# Patient Record
Sex: MALE | Race: WHITE | Employment: UNEMPLOYED | URBAN - METROPOLITAN AREA
[De-identification: names, ages, dates, MRNs, and addresses within clinical notes are randomized per-mention and may not be internally consistent; named-entity substitution may affect disease eponyms.]

---

## 2017-02-03 RX ORDER — GABAPENTIN 300 MG/1
CAPSULE ORAL
Qty: 360 CAPSULE | Refills: 0 | Status: SHIPPED | OUTPATIENT
Start: 2017-02-03 | End: 2017-04-27

## 2017-02-03 RX ORDER — LISINOPRIL 10 MG/1
TABLET ORAL
Qty: 90 TABLET | Refills: 0 | Status: SHIPPED | OUTPATIENT
Start: 2017-02-03 | End: 2017-05-16

## 2017-02-03 NOTE — TELEPHONE ENCOUNTER
Last OV 9/2/16 b/p=116/70  Last ordered gabapentin 11/1/16 #360                      Lisinopril 8/10/16 #90

## 2017-04-27 RX ORDER — GABAPENTIN 300 MG/1
CAPSULE ORAL
Qty: 360 CAPSULE | Refills: 0 | Status: SHIPPED | OUTPATIENT
Start: 2017-04-27 | End: 2017-07-12

## 2017-05-16 RX ORDER — LISINOPRIL 10 MG/1
TABLET ORAL
Qty: 90 TABLET | Refills: 0 | Status: SHIPPED | OUTPATIENT
Start: 2017-05-16 | End: 2017-09-06

## 2017-07-13 RX ORDER — GABAPENTIN 300 MG/1
CAPSULE ORAL
Qty: 360 CAPSULE | Refills: 0 | Status: SHIPPED | OUTPATIENT
Start: 2017-07-13 | End: 2017-10-03

## 2017-08-23 ENCOUNTER — HOSPITAL ENCOUNTER (OUTPATIENT)
Dept: CT IMAGING | Age: 62
Discharge: HOME | End: 2017-08-23
Attending: FAMILY MEDICINE
Payer: MEDICAID

## 2017-08-23 ENCOUNTER — OFFICE VISIT (OUTPATIENT)
Dept: FAMILY MEDICINE CLINIC | Facility: CLINIC | Age: 62
End: 2017-08-23

## 2017-08-23 ENCOUNTER — TELEPHONE (OUTPATIENT)
Dept: FAMILY MEDICINE CLINIC | Facility: CLINIC | Age: 62
End: 2017-08-23

## 2017-08-23 VITALS
SYSTOLIC BLOOD PRESSURE: 130 MMHG | BODY MASS INDEX: 27.88 KG/M2 | DIASTOLIC BLOOD PRESSURE: 78 MMHG | TEMPERATURE: 98 F | HEIGHT: 73 IN | WEIGHT: 210.38 LBS | HEART RATE: 53 BPM | OXYGEN SATURATION: 96 %

## 2017-08-23 DIAGNOSIS — K57.32 DIVERTICULITIS OF LARGE INTESTINE WITHOUT PERFORATION OR ABSCESS WITHOUT BLEEDING: Primary | ICD-10-CM

## 2017-08-23 DIAGNOSIS — G57.93 NEUROPATHY OF BOTH FEET: ICD-10-CM

## 2017-08-23 DIAGNOSIS — Z72.0 TOBACCO ABUSE: ICD-10-CM

## 2017-08-23 DIAGNOSIS — K57.32 DIVERTICULITIS OF LARGE INTESTINE WITHOUT PERFORATION OR ABSCESS WITHOUT BLEEDING: ICD-10-CM

## 2017-08-23 PROCEDURE — 99214 OFFICE O/P EST MOD 30 MIN: CPT | Performed by: FAMILY MEDICINE

## 2017-08-23 RX ORDER — AMOXICILLIN AND CLAVULANATE POTASSIUM 875; 125 MG/1; MG/1
1 TABLET, FILM COATED ORAL 2 TIMES DAILY
Qty: 20 TABLET | Refills: 0 | Status: SHIPPED | OUTPATIENT
Start: 2017-08-23 | End: 2017-09-02

## 2017-08-23 NOTE — TELEPHONE ENCOUNTER
I called [facility] as I noticed that the STAT CT for this afternoon had been rescheduled for tomorrow evening at 8pm    Future Appointments  Date Time Provider Eric Enriquez   8/24/2017 8:00 PM OS CT RM 1 [facility]     Per radiology the jo
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
## 2017-08-23 NOTE — TELEPHONE ENCOUNTER
Patient needs STAT CT abd/pelvis of abdomen for Diverticulitis   In [facility] today    Calling [employer]- spoke with Conchetta [facility]-    He can go now -

## 2017-08-24 ENCOUNTER — HOSPITAL ENCOUNTER (OUTPATIENT)
Dept: CT IMAGING | Age: 62
Discharge: HOME | End: 2017-08-24
Attending: FAMILY MEDICINE
Payer: MEDICAID

## 2017-08-24 PROCEDURE — 74176 CT ABD & PELVIS W/O CONTRAST: CPT | Performed by: FAMILY MEDICINE

## 2017-09-06 RX ORDER — LISINOPRIL 10 MG/1
TABLET ORAL
Qty: 90 TABLET | Refills: 0 | Status: SHIPPED | OUTPATIENT
Start: 2017-09-06 | End: 2017-12-11

## 2017-10-03 RX ORDER — GABAPENTIN 300 MG/1
CAPSULE ORAL
Qty: 360 CAPSULE | Refills: 0 | Status: SHIPPED | OUTPATIENT
Start: 2017-10-03 | End: 2017-12-11

## 2017-12-11 ENCOUNTER — OFFICE VISIT (OUTPATIENT)
Dept: FAMILY MEDICINE CLINIC | Facility: CLINIC | Age: 62
End: 2017-12-11

## 2017-12-11 VITALS
TEMPERATURE: 98 F | WEIGHT: 212.13 LBS | OXYGEN SATURATION: 92 % | DIASTOLIC BLOOD PRESSURE: 86 MMHG | HEART RATE: 55 BPM | BODY MASS INDEX: 28 KG/M2 | SYSTOLIC BLOOD PRESSURE: 130 MMHG

## 2017-12-11 DIAGNOSIS — I10 ESSENTIAL HYPERTENSION: Primary | ICD-10-CM

## 2017-12-11 DIAGNOSIS — Z12.5 PROSTATE CANCER SCREENING: ICD-10-CM

## 2017-12-11 PROCEDURE — 80061 LIPID PANEL: CPT | Performed by: FAMILY MEDICINE

## 2017-12-11 PROCEDURE — 80053 COMPREHEN METABOLIC PANEL: CPT | Performed by: FAMILY MEDICINE

## 2017-12-11 PROCEDURE — 99214 OFFICE O/P EST MOD 30 MIN: CPT | Performed by: FAMILY MEDICINE

## 2017-12-11 PROCEDURE — 36415 COLL VENOUS BLD VENIPUNCTURE: CPT | Performed by: FAMILY MEDICINE

## 2017-12-11 RX ORDER — GABAPENTIN 300 MG/1
CAPSULE ORAL
Qty: 360 CAPSULE | Refills: 0 | Status: SHIPPED | OUTPATIENT
Start: 2017-12-11 | End: 2018-01-22

## 2017-12-11 RX ORDER — LISINOPRIL 10 MG/1
TABLET ORAL
Qty: 90 TABLET | Refills: 0 | Status: SHIPPED | OUTPATIENT
Start: 2017-12-11 | End: 2018-04-03

## 2017-12-11 NOTE — PROGRESS NOTES
Jillian Vee is a 63year old male. Patient presents with: Other: med check. ..room       HPI:   Patient presents for recheck of his blood pressure. He is due for lab work. No complaints of chest pain.   He does get short of breath with exertion but bl (Tympanic)   Wt 212 lb 2 oz   SpO2 92%   BMI 27.99 kg/m²   GENERAL: well developed, well nourished,in no apparent distress  SKIN: no rashes,no suspicious lesions  HEENT: atraumatic, normocephalic, R TM normal, L TM normal, Pharynx normal  NECK: supple, no

## 2017-12-12 NOTE — PROGRESS NOTES
Notify lipids normal. Recheck in 12  months. Notify chem normal including kidney function, liver function, electrolytes and nutritional markers. Recheck comprehensive panel in  12   months. Notify PSA normal.  Repeat in 1 year.

## 2018-01-22 ENCOUNTER — TELEPHONE (OUTPATIENT)
Dept: FAMILY MEDICINE CLINIC | Facility: CLINIC | Age: 63
End: 2018-01-22

## 2018-01-22 RX ORDER — GABAPENTIN 300 MG/1
CAPSULE ORAL
Qty: 360 CAPSULE | Refills: 0 | Status: SHIPPED | OUTPATIENT
Start: 2018-01-22 | End: 2018-04-03

## 2018-04-03 ENCOUNTER — TELEPHONE (OUTPATIENT)
Dept: FAMILY MEDICINE CLINIC | Facility: CLINIC | Age: 63
End: 2018-04-03

## 2018-04-03 RX ORDER — LISINOPRIL 10 MG/1
TABLET ORAL
Qty: 90 TABLET | Refills: 0 | Status: SHIPPED | OUTPATIENT
Start: 2018-04-03 | End: 2018-08-23

## 2018-04-03 RX ORDER — GABAPENTIN 300 MG/1
CAPSULE ORAL
Qty: 180 CAPSULE | Refills: 0 | Status: SHIPPED | OUTPATIENT
Start: 2018-04-03 | End: 2018-08-23

## 2018-06-26 RX ORDER — GABAPENTIN 300 MG/1
CAPSULE ORAL
Qty: 360 CAPSULE | Refills: 0 | Status: SHIPPED | OUTPATIENT
Start: 2018-06-26 | End: 2018-08-23

## 2018-08-16 ENCOUNTER — TELEPHONE (OUTPATIENT)
Dept: FAMILY MEDICINE CLINIC | Facility: CLINIC | Age: 63
End: 2018-08-16

## 2018-08-16 NOTE — TELEPHONE ENCOUNTER
Contacted patient, spoke with wife. Wife states due to insurance changes they have to change PCP and is requesting recent information to be sent. Contacted Dr. Landy Mcbride office at [phone] to clarify what records they would like sent.  Shayla

## 2018-08-23 RX ORDER — GABAPENTIN 300 MG/1
CAPSULE ORAL
Qty: 360 CAPSULE | Refills: 0 | Status: SHIPPED | OUTPATIENT
Start: 2018-08-23

## 2018-08-23 RX ORDER — LISINOPRIL 10 MG/1
TABLET ORAL
Qty: 90 TABLET | Refills: 0 | Status: SHIPPED | OUTPATIENT
Start: 2018-08-23

## 2018-08-23 NOTE — TELEPHONE ENCOUNTER
Last office visit:  12/11/17  Last refill:  04/03/18   #90 with no refills  Last cmp:  12/11/17  Last bp:  12/11/17   130/86    GABAPENTIN:  Last refill:  06/26/18   #360 with no refills    No future appointments.     Calling pt to schedule office visit and

## 2019-02-28 RX ORDER — GABAPENTIN 300 MG/1
CAPSULE ORAL
Qty: 360 CAPSULE | Refills: 0 | OUTPATIENT
Start: 2019-02-28

## 2019-02-28 NOTE — TELEPHONE ENCOUNTER
Last office visit 12-11-17  Last refill 8-23-18 #360  No future appointments. Has Bhargav. Per telephone encounter 8-16-18, sees Dr. Miles Gonzalez.

## 2019-07-05 ENCOUNTER — APPOINTMENT (OUTPATIENT)
Dept: GENERAL RADIOLOGY | Age: 64
End: 2019-07-05
Attending: FAMILY MEDICINE
Payer: MEDICAID

## 2019-07-05 ENCOUNTER — HOSPITAL ENCOUNTER (OUTPATIENT)
Age: 64
Discharge: HOME | End: 2019-07-05
Attending: FAMILY MEDICINE
Payer: MEDICAID

## 2019-07-05 VITALS
HEART RATE: 65 BPM | DIASTOLIC BLOOD PRESSURE: 91 MMHG | SYSTOLIC BLOOD PRESSURE: 154 MMHG | BODY MASS INDEX: 28 KG/M2 | OXYGEN SATURATION: 95 % | WEIGHT: 210 LBS | TEMPERATURE: 98 F | RESPIRATION RATE: 16 BRPM
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
## 2019-07-05 DIAGNOSIS — S80.811A ABRASION OF LEG, RIGHT, INITIAL ENCOUNTER: ICD-10-CM

## 2019-07-05 DIAGNOSIS — S10.91XA: ICD-10-CM

## 2019-07-05 DIAGNOSIS — M79.672 LEFT FOOT PAIN: ICD-10-CM

## 2019-07-05 DIAGNOSIS — M25.561 ACUTE PAIN OF RIGHT KNEE: Primary | ICD-10-CM

## 2019-07-05 PROCEDURE — 73630 X-RAY EXAM OF FOOT: CPT | Performed by: FAMILY MEDICINE

## 2019-07-05 PROCEDURE — 99214 OFFICE O/P EST MOD 30 MIN: CPT

## 2019-07-05 PROCEDURE — 73562 X-RAY EXAM OF KNEE 3: CPT | Performed by: FAMILY MEDICINE
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
## 2020-06-22 ENCOUNTER — HOSPITAL ENCOUNTER (OUTPATIENT)
Age: 65
Discharge: HOME | End: 2020-06-22
Attending: FAMILY MEDICINE
Payer: MEDICAID

## 2020-06-22 ENCOUNTER — APPOINTMENT (OUTPATIENT)
Dept: GENERAL RADIOLOGY | Age: 65
End: 2020-06-22
Attending: FAMILY MEDICINE
Payer: MEDICAID

## 2020-06-22 VITALS
RESPIRATION RATE: 18 BRPM | TEMPERATURE: 99 F | DIASTOLIC BLOOD PRESSURE: 83 MMHG | HEART RATE: 58 BPM | WEIGHT: 200 LBS | BODY MASS INDEX: 26.51 KG/M2 | HEIGHT: 73 IN | OXYGEN SATURATION: 95 % | SYSTOLIC BLOOD PRESSURE: 123 MMHG

## 2020-06-22 DIAGNOSIS — Z20.822 PERSON UNDER INVESTIGATION FOR COVID-19: ICD-10-CM

## 2020-06-22 DIAGNOSIS — J44.1 COPD EXACERBATION (HCC): Primary | ICD-10-CM

## 2020-06-22 PROCEDURE — 71046 X-RAY EXAM CHEST 2 VIEWS: CPT | Performed by: FAMILY MEDICINE

## 2020-06-22 PROCEDURE — 99213 OFFICE O/P EST LOW 20 MIN: CPT

## 2020-06-22 PROCEDURE — 99214 OFFICE O/P EST MOD 30 MIN: CPT

## 2020-06-22 RX ORDER — AZITHROMYCIN 250 MG/1
TABLET, FILM COATED ORAL
Qty: 1 PACKAGE | Refills: 0 | Status: SHIPPED | OUTPATIENT
Start: 2020-06-22 | End: 2020-06-27

## 2020-06-22 RX ORDER — ALBUTEROL SULFATE 90 UG/1
2 AEROSOL, METERED RESPIRATORY (INHALATION) EVERY 4 HOURS PRN
Qty: 1 INHALER | Refills: 0 | Status: SHIPPED | OUTPATIENT
Start: 2020-06-22 | End: 2020-07-22

## 2020-06-22 NOTE — ED PROVIDER NOTES
Patient Seen in: [id number] [facility]      History   Patient presents with:  Dyspnea ESTUARDO SOB    Stated Complaint:     HPI  This is a 73-year-old male coming in with some congestion he has had a cough as well for the last 3 to 4 days.   Has some conversation, smiling and conversing well answering appropriately   SKIN: No pallor, no erythema, no cyanosis, warm and dry  Eyes: wnl, normal conjunctiva   HEAD: Normocephalic, atraumatic  EENT: OP - wnl, moist, Nares normal  NECK: FROM, supple  LUNGS: CT pulmonary opacity. No pleural effusion. Mild degenerative spurring of thoracic spine. CONCLUSION:  Large lung volumes consistent with COPD. No acute cardiopulmonary pathology.     Dictated by: Kyle Quintanilla MD on 6/22/2020 at 6:14 PM     Finalized by: ALEXA

## 2020-06-22 NOTE — ED INITIAL ASSESSMENT (HPI)
Pt co congested cough and ESTUARDO with exacerbation over the past couple of days.   Co sinus congestion

## 2023-09-04 ENCOUNTER — HOSPITAL ENCOUNTER (OUTPATIENT)
Age: 68
Discharge: HOME | End: 2023-09-04
Payer: MEDICARE

## 2023-09-04 VITALS
DIASTOLIC BLOOD PRESSURE: 65 MMHG | SYSTOLIC BLOOD PRESSURE: 140 MMHG | HEART RATE: 60 BPM | TEMPERATURE: 99 F | RESPIRATION RATE: 16 BRPM | OXYGEN SATURATION: 95 %

## 2023-09-04 DIAGNOSIS — S51.851A DOG BITE OF RIGHT FOREARM, INITIAL ENCOUNTER: Primary | ICD-10-CM
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
## 2023-09-04 PROCEDURE — 99203 OFFICE O/P NEW LOW 30 MIN: CPT | Performed by: NURSE PRACTITIONER

## 2023-09-04 PROCEDURE — 90471 IMMUNIZATION ADMIN: CPT | Performed by: NURSE PRACTITIONER

## 2023-09-04 PROCEDURE — 90715 TDAP VACCINE 7 YRS/> IM: CPT | Performed by: NURSE PRACTITIONER

## 2023-09-04 RX ORDER — AMOXICILLIN AND CLAVULANATE POTASSIUM 875; 125 MG/1; MG/1
1 TABLET, FILM COATED ORAL 2 TIMES DAILY
Qty: 14 TABLET | Refills: 0 | Status: SHIPPED | OUTPATIENT
Start: 2023-09-04 | End: 2023-09-11

## 2023-09-04 NOTE — ED INITIAL ASSESSMENT (HPI)
Pt states was in the [facility] and a dog come up to him, he reached to check his collar for the owner and it bit down on his right forearm. Puncture wounds to either side, bleeding controlled. Swelling and pain.

## 2023-09-04 NOTE — DISCHARGE INSTRUCTIONS
Keep the dressing clean, dry, intact for 24 hours. There after may remove the dressing. May clean with mild soap and water, gently pat dry. Use an over-the-counter topical antibiotic ointment such as Neosporin or bacitracin to prevent infection. Return for new or worsening symptoms, especially signs of infection such as fevers, redness, drainage, increasing pain.

## 2024-04-11 ENCOUNTER — APPOINTMENT (OUTPATIENT)
Dept: GENERAL RADIOLOGY | Age: 69
End: 2024-04-11
Attending: NURSE PRACTITIONER
Payer: MEDICARE

## 2024-04-11 ENCOUNTER — HOSPITAL ENCOUNTER (OUTPATIENT)
Age: 69
Discharge: HOME | End: 2024-04-11
Payer: MEDICARE

## 2024-04-11 VITALS
WEIGHT: 199.94 LBS | DIASTOLIC BLOOD PRESSURE: 98 MMHG | RESPIRATION RATE: 18 BRPM | TEMPERATURE: 98 F | OXYGEN SATURATION: 94 % | HEART RATE: 62 BPM | SYSTOLIC BLOOD PRESSURE: 183 MMHG | BODY MASS INDEX: 26 KG/M2

## 2024-04-11 DIAGNOSIS — R05.1 ACUTE COUGH: Primary | ICD-10-CM

## 2024-04-11 DIAGNOSIS — J98.8 VIRAL RESPIRATORY ILLNESS: ICD-10-CM

## 2024-04-11 DIAGNOSIS — B97.89 VIRAL RESPIRATORY ILLNESS: ICD-10-CM

## 2024-04-11 PROCEDURE — 99214 OFFICE O/P EST MOD 30 MIN: CPT | Performed by: NURSE PRACTITIONER

## 2024-04-11 PROCEDURE — 71046 X-RAY EXAM CHEST 2 VIEWS: CPT | Performed by: NURSE PRACTITIONER

## 2024-04-11 RX ORDER — FLUTICASONE PROPIONATE 50 MCG
SPRAY, SUSPENSION (ML) NASAL DAILY
COMMUNITY

## 2024-04-11 RX ORDER — BUDESONIDE AND FORMOTEROL FUMARATE DIHYDRATE 80; 4.5 UG/1; UG/1
AEROSOL RESPIRATORY (INHALATION) 2 TIMES DAILY
COMMUNITY

## 2024-04-11 RX ORDER — ALBUTEROL SULFATE 90 UG/1
AEROSOL, METERED RESPIRATORY (INHALATION) EVERY 6 HOURS PRN
COMMUNITY

## 2024-04-11 RX ORDER — PREDNISONE 20 MG/1
40 TABLET ORAL DAILY
Qty: 10 TABLET | Refills: 0 | Status: SHIPPED | OUTPATIENT
Start: 2024-04-11 | End: 2024-04-16

## 2024-04-11 RX ORDER — ATORVASTATIN CALCIUM 40 MG/1
40 TABLET, FILM COATED ORAL NIGHTLY
COMMUNITY
Start: 2024-03-18

## 2024-04-11 RX ORDER — METOPROLOL SUCCINATE 25 MG/1
25 TABLET, EXTENDED RELEASE ORAL DAILY
COMMUNITY
Start: 2024-03-18

## 2024-04-11 RX ORDER — HYDRALAZINE HYDROCHLORIDE 10 MG/1
1 TABLET, FILM COATED ORAL 2 TIMES DAILY
COMMUNITY
Start: 2024-03-18

## 2024-04-11 RX ORDER — FUROSEMIDE 20 MG/1
TABLET ORAL
COMMUNITY
Start: 2024-03-18

## 2024-04-11 RX ORDER — MELATONIN
2000 DAILY
COMMUNITY

## 2024-04-11 RX ORDER — LOSARTAN POTASSIUM 50 MG/1
50 TABLET ORAL DAILY
COMMUNITY
Start: 2024-03-18

## 2024-04-11 NOTE — ED PROVIDER NOTES
Patient Seen in: [facility]      History     Chief Complaint   Patient presents with    Cough/URI     Stated Complaint: cough, runny nose    Subjective:   68-year-old male, who presents with 1 week of cough, fever, runny nose.  States a household [place] member is sick with similar symptoms.  Has been using his albuterol inhaler.            Objective:   Past Medical History:    Esophageal reflux    EGD 2012    Essential hypertension    Heroin addiction (HCC), previously    History of coronary angiogram    normal [facility]    HTN (hypertension)    Neuropathy of both feet    Pulmonary nodule seen on imaging study    CT CHEST 7/23/2013 with 2 mm RUL nodule , no change 8/2014              History reviewed. No pertinent surgical history.             Social History     Socioeconomic History    Marital status:    Occupational History    Occupation: [employer]    Occupation:      Employer: [employer]   Tobacco Use    Smoking status: Every Day     Current packs/day: 0.75     Average packs/day: 0.8 packs/day for 40.0 years (30.0 ttl pk-yrs)     Types: Cigarettes    Smokeless tobacco: Never   Substance and Sexual Activity    Alcohol use: No    Drug use: No     Comment: used to be addicted to heroin     Social Determinants of Health     Financial Resource Strain: Medium Risk (3/7/2024)    Received from [facility]    Overall Financial Resource Strain (CARDIA)     Difficulty of Paying Living Expenses: Somewhat hard   Food Insecurity: Food Insecurity Present (3/7/2024)    Received from [facility]    Hunger Vital Sign     Worried About Running Out of Food in the Last Year: Sometimes true     Ran Out of Food in the Last Year: Never true   Transportation Needs: Unmet Transportation Needs (3/7/2024)    Received from [facility]    PRAPARE - Transportation     Lack of Transportation (Medical): Yes     Lack of Transportation (Non-Medical): Yes    [facility]    Social Connections   Housing Stability: High Risk (3/7/2024)    Received from [facility]    Housing Stability Vital Sign     Unable to Pay for Housing in the Last Year: Yes     Number of Places Lived in the Last Year: 1     In the last 12 months, was there a time when you did not have a steady place to sleep or slept in a shelter (including now)?: No              Review of Systems   Constitutional:  Positive for fever.   HENT:  Positive for rhinorrhea.    Respiratory:  Positive for cough and wheezing.    All other systems reviewed and are negative.      Positive for stated complaint: cough, runny nose  Other systems are as noted in HPI.  Constitutional and vital signs reviewed.      All other systems reviewed and negative except as noted above.    Physical Exam     ED Triage Vitals [04/11/24 1849]   BP (!) 183/98   Pulse 62   Resp 18   Temp 97.8 °F (36.6 °C)   Temp src Temporal   SpO2 94 %   O2 Device None (Room air)       Current:BP (!) 183/98   Pulse 62   Temp 97.8 °F (36.6 °C) (Temporal)   Resp 18   Wt 90.7 kg   SpO2 94%   BMI 26.38 kg/m²         Physical Exam  Vitals and nursing note reviewed.   Constitutional:       Appearance: Normal appearance. He is not ill-appearing, toxic-appearing or diaphoretic.   HENT:      Head:      Jaw: No trismus.      Mouth/Throat:      Lips: Pink. No lesions.      Mouth: Mucous membranes are moist. No oral lesions or angioedema.      Tongue: No lesions.      Palate: No lesions.      Pharynx: Oropharynx is clear. Uvula midline. No pharyngeal swelling, oropharyngeal exudate, posterior oropharyngeal erythema or uvula swelling.   Cardiovascular:      Rate and Rhythm: Normal rate and regular rhythm.      Pulses: Normal pulses.      Heart sounds: Normal heart sounds.   Pulmonary:      Effort: Pulmonary effort is normal. No respiratory distress.      Breath sounds: No stridor. Wheezing  present. No rhonchi or rales.   Skin:     General: Skin is warm and dry.      Capillary Refill: Capillary refill takes less than 2 seconds.      Coloration: Skin is not jaundiced or pale.      Findings: No rash.   Neurological:      General: No focal deficit present.      Mental Status: He is alert.   Psychiatric:         Mood and Affect: Mood normal.               ED Course   Labs Reviewed - No data to display  XR CHEST PA + LAT CHEST (CPT=71046)    Result Date: 4/11/2024  PROCEDURE:  XR CHEST PA + LAT CHEST (CPT=71046)  INDICATIONS:  cough, runny nose  COMPARISON:  [facility], XR, XR CHEST PA + LAT CHEST (CPT=71046), 6/22/2020, 5:54 PM.  TECHNIQUE:  PA and lateral chest radiographs were obtained.  PATIENT STATED HISTORY: (As transcribed by Technologist)  Patient with cough and congestion for a week.    FINDINGS:  LUNGS:  No focal consolidation.  Normal vascularity. CARDIAC:  Normal size cardiac silhouette. MEDIASTINUM:  Aorta is atherosclerotic. PLEURA:  Normal.  No pleural effusions. BONES:  Normal for age.            CONCLUSION:  There is no evidence of active cardiopulmonary disease.   LOCATION:  [facility]   Dictated by (CST): Donnie Miguel MD on 4/11/2024 at 7:08 PM     Finalized by (CST): Donnie Miguel MD on 4/11/2024 at 7:08 PM                       [facility]                                         Medical Decision Making  Differential diagnosis initially included but was not limited to: Viral URI with wheezing, pneumonia    68-year-old male, with 1 week of cough, wheezing, fever.  Some slight wheezing.  Not tachypneic or hypoxic.  Will obtain chest x-ray.  I personally viewed, independently interpreted and radiology report was reviewed.  My personal interpretation is no pneumonia.    Supportive/home management of diagnosis/illness/injury discussed. Red flag symptoms discussed.  Signs and symptoms/criteria that would necessitate reevaluation, including ER evaluation discussed.  Patient and/or  responsible adult verbalize and agree with management and plan of care.    Speech recognition software was used during this dictation.  There may be minor errors in transcription.          Amount and/or Complexity of Data Reviewed  Radiology: ordered and independent interpretation performed. Decision-making details documented in ED Course.        Disposition and Plan     Clinical Impression:  1. Acute cough    2. Viral respiratory illness         Disposition:  Discharge  4/11/2024  7:12 pm    Follow-up:  Dorota Logan  [address] [place] [id number]  [phone]    Call in 2 days            Medications Prescribed:  Current Discharge Medication List        START taking these medications    Details   predniSONE 20 MG Oral Tab Take 2 tablets (40 mg total) by mouth daily for 5 days.  Qty: 10 tablet, Refills: 0
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
## 2024-04-12 NOTE — DISCHARGE INSTRUCTIONS
Over-the-counter Zyrtec 10 mg daily for the next 2 weeks.  Take the prednisone as prescribed.  Take your albuterol inhaler 2 puffs every 4 hours as needed for cough, wheezing, chest tightness, shortness of breath, chest congestion.  Humidifier in the same room.  Follow-up with your doctor.  Go to the ER for new or worsening symptoms.

## 2025-03-25 ENCOUNTER — HOSPITAL ENCOUNTER (OUTPATIENT)
Age: 70
Discharge: HOME | End: 2025-03-25
Payer: MEDICARE

## 2025-03-25 VITALS
TEMPERATURE: 98 F | DIASTOLIC BLOOD PRESSURE: 108 MMHG | HEIGHT: 72 IN | SYSTOLIC BLOOD PRESSURE: 188 MMHG | HEART RATE: 68 BPM | BODY MASS INDEX: 29.26 KG/M2 | WEIGHT: 216 LBS | OXYGEN SATURATION: 93 % | RESPIRATION RATE: 18 BRPM

## 2025-03-25 DIAGNOSIS — S01.83XA PUNCTURE WOUND OF FACE, INITIAL ENCOUNTER: Primary | ICD-10-CM

## 2025-03-25 PROCEDURE — 99213 OFFICE O/P EST LOW 20 MIN: CPT | Performed by: NURSE PRACTITIONER

## 2025-03-25 RX ORDER — CEPHALEXIN 500 MG/1
500 CAPSULE ORAL 2 TIMES DAILY
Qty: 10 CAPSULE | Refills: 0 | Status: SHIPPED | OUTPATIENT
Start: 2025-03-25 | End: 2025-03-30

## 2025-03-25 NOTE — ED INITIAL ASSESSMENT (HPI)
Fell after getting up off of couch overnight, striking right jaw on metal stake to support plant. No LOC.    Last tetanus Sept 2023
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
